# Patient Record
Sex: MALE | Race: WHITE | Employment: FULL TIME | URBAN - METROPOLITAN AREA
[De-identification: names, ages, dates, MRNs, and addresses within clinical notes are randomized per-mention and may not be internally consistent; named-entity substitution may affect disease eponyms.]

---

## 2019-12-17 ENCOUNTER — OFFICE VISIT (OUTPATIENT)
Dept: CARDIOLOGY CLINIC | Age: 59
End: 2019-12-17

## 2019-12-17 VITALS
SYSTOLIC BLOOD PRESSURE: 126 MMHG | BODY MASS INDEX: 28.19 KG/M2 | HEART RATE: 85 BPM | WEIGHT: 201.4 LBS | DIASTOLIC BLOOD PRESSURE: 82 MMHG | OXYGEN SATURATION: 94 % | HEIGHT: 71 IN

## 2019-12-17 DIAGNOSIS — I20.0 UNSTABLE ANGINA PECTORIS (HCC): ICD-10-CM

## 2019-12-17 DIAGNOSIS — E78.2 MIXED HYPERLIPIDEMIA: ICD-10-CM

## 2019-12-17 DIAGNOSIS — I10 ESSENTIAL HYPERTENSION: ICD-10-CM

## 2019-12-17 DIAGNOSIS — R00.2 PALPITATIONS: Primary | ICD-10-CM

## 2019-12-17 RX ORDER — PRAZOSIN HYDROCHLORIDE 1 MG/1
2 CAPSULE ORAL 2 TIMES DAILY
COMMUNITY

## 2019-12-17 RX ORDER — METOPROLOL SUCCINATE 25 MG/1
TABLET, EXTENDED RELEASE ORAL
Refills: 0 | Status: ON HOLD | COMMUNITY
Start: 2019-12-09 | End: 2019-12-19 | Stop reason: SDUPTHER

## 2019-12-17 RX ORDER — ATORVASTATIN CALCIUM 20 MG/1
TABLET, FILM COATED ORAL
Refills: 1 | COMMUNITY
Start: 2019-10-07

## 2019-12-17 NOTE — PROGRESS NOTES
Jean Pierre Bernabe is a 61 y.o. male    Chief Complaint   Patient presents with    New Patient     palpitations       Chest pain patient states some CP    SOB patient states some SOB with exertion    Dizziness patient states some dizziness throughout the day    Swelling some swelling in his hand    Refills No    Visit Vitals  /82 (BP 1 Location: Left arm, BP Patient Position: Sitting)   Pulse 85   Ht 5' 11\" (1.803 m)   Wt 201 lb 6.4 oz (91.4 kg)   SpO2 94%   BMI 28.09 kg/m²       1. Have you been to the ER, urgent care clinic since your last visit? Hospitalized since your last visit? No    2. Have you seen or consulted any other health care providers outside of the [address] [place] [place] [address] since your last visit? Include any pap smears or colon screening.   No

## 2019-12-18 DIAGNOSIS — R07.9 CHEST PAIN, UNSPECIFIED TYPE: Primary | ICD-10-CM

## 2019-12-18 LAB
BASOPHILS # BLD AUTO: 0.1 X10E3/UL (ref 0–0.2)
BASOPHILS NFR BLD AUTO: 1 %
BUN SERPL-MCNC: 17 MG/DL (ref 6–24)
BUN/CREAT SERPL: 16 (ref 9–20)
CALCIUM SERPL-MCNC: 9.5 MG/DL (ref 8.7–10.2)
CHLORIDE SERPL-SCNC: 104 MMOL/L (ref 96–106)
CO2 SERPL-SCNC: 21 MMOL/L (ref 20–29)
CREAT SERPL-MCNC: 1.04 MG/DL (ref 0.76–1.27)
EOSINOPHIL # BLD AUTO: 0.2 X10E3/UL (ref 0–0.4)
EOSINOPHIL NFR BLD AUTO: 4 %
ERYTHROCYTE [DISTWIDTH] IN BLOOD BY AUTOMATED COUNT: 12.2 % (ref 12.3–15.4)
GLUCOSE SERPL-MCNC: 87 MG/DL (ref 65–99)
HCT VFR BLD AUTO: 44.6 % (ref 37.5–51)
HGB BLD-MCNC: 15.1 G/DL (ref 13–17.7)
IMM GRANULOCYTES # BLD AUTO: 0 X10E3/UL (ref 0–0.1)
IMM GRANULOCYTES NFR BLD AUTO: 0 %
LYMPHOCYTES # BLD AUTO: 2 X10E3/UL (ref 0.7–3.1)
LYMPHOCYTES NFR BLD AUTO: 35 %
MCH RBC QN AUTO: 31.4 PG (ref 26.6–33)
MCHC RBC AUTO-ENTMCNC: 33.9 G/DL (ref 31.5–35.7)
MCV RBC AUTO: 93 FL (ref 79–97)
MONOCYTES # BLD AUTO: 0.5 X10E3/UL (ref 0.1–0.9)
MONOCYTES NFR BLD AUTO: 8 %
NEUTROPHILS # BLD AUTO: 3 X10E3/UL (ref 1.4–7)
NEUTROPHILS NFR BLD AUTO: 52 %
PLATELET # BLD AUTO: 221 X10E3/UL (ref 150–450)
POTASSIUM SERPL-SCNC: 4.4 MMOL/L (ref 3.5–5.2)
RBC # BLD AUTO: 4.81 X10E6/UL (ref 4.14–5.8)
SODIUM SERPL-SCNC: 144 MMOL/L (ref 134–144)
WBC # BLD AUTO: 5.8 X10E3/UL (ref 3.4–10.8)

## 2019-12-18 RX ORDER — SODIUM CHLORIDE 0.9 % (FLUSH) 0.9 %
5-40 SYRINGE (ML) INJECTION EVERY 8 HOURS
Status: CANCELLED | OUTPATIENT
Start: 2019-12-19

## 2019-12-18 RX ORDER — SODIUM CHLORIDE 0.9 % (FLUSH) 0.9 %
5-40 SYRINGE (ML) INJECTION AS NEEDED
Status: CANCELLED | OUTPATIENT
Start: 2019-12-19

## 2019-12-18 RX ORDER — SODIUM CHLORIDE 9 MG/ML
100 INJECTION, SOLUTION INTRAVENOUS CONTINUOUS
Status: CANCELLED | OUTPATIENT
Start: 2019-12-19

## 2019-12-18 NOTE — H&P (VIEW-ONLY)
[facility] [address], [address] [place], [address] [facility] Office [phone] Ann-Marie Sahu is a 61 y.o. male presents for evaluation of chest pain. Consult requested by [facility]. 
 
 
Assessment/Recommendations: ICD-10-CM ICD-9-CM 1. Palpitations R00.2 785.1 AMB POC EKG ROUTINE W/ 12 LEADS, INTER & REP  
   CBC WITH AUTOMATED DIFF  
   METABOLIC PANEL, BASIC  
2. Unstable angina pectoris (HCC) I20.0 411.1 3. Essential hypertension I10 401.9 4. Mixed hyperlipidemia E78.2 272.2 Resting chest discomfort with diaphoresis is concerning. However, he is active w/o any exertional angina symptoms Discussed role of stress testing and invasive angiography with concerning chest pan symptoms. He wishes to proceed with coronary angiography 
 
- cont metoprolol 
- cont statin therapy Primary Care Physician- Wes Goodman, DO Follow-up based on cath results Subjective: 
61 y.o. male referred from [facility] for evaluation of chest pain. He has a long history of hypertension. He reports acute onset on substernal chest discomfort associated diaphoresis approximately 10 days ago. He has had several episodes of chest discomfort at rest that last several minutes. Last episode was on Friday. He was seen at [facility] and referred to our office. No ALATORRE, SOB. Reports occasional palpitations when he lays on his side while sleeping. Unsure of his cholesterol, but has been on statin for several years. He walks several miles daily at work without any chest pain symptoms. Performed yard work last weekend w/o chest pain symptoms. Smoked 1/4 ppd for ~15 years, quit in 2014. History 
hypertension No past surgical history on file.  
 
 
Current Outpatient Medications:  
  metoprolol succinate (TOPROL-XL) 25 mg XL tablet, TAKE 1 TABLET BY MOUTH EVERY DAY, Disp: , Rfl: 0 
   atorvastatin (LIPITOR) 20 mg tablet, TAKE 1 TABLET BY MOUTH EVERY DAY, Disp: , Rfl: 1   prazosin (MINIPRESS) 1 mg capsule, Take 2 mg by mouth two (2) times a day. Indications: patient is not sure of exact dose, Disp: , Rfl:  
  allopurinol (ZYLOPRIM) 300 mg tablet, Take  by mouth daily. , Disp: , Rfl:  
  TRIAMTERENE-HYDROCHLOROTHIAZID PO, Take  by mouth., Disp: , Rfl:  
 
No Known Allergies Family History Problem Relation Age of Onset  Cancer Mother  Cancer Father Social History Tobacco Use  Smoking status: Former Smoker Packs/day: 0.25 Last attempt to quit: 2014 Years since quittin.9  Smokeless tobacco: Never Used Substance Use Topics  Alcohol use: Yes Alcohol/week: 10.0 standard drinks Types: 12 Standard drinks or equivalent per week  Drug use: Not on file Mom- AAA Works as  at [employer] Review of Symptoms: 
Pertinent Positive: chest pain, diaphoresis Pertinent Negative: no shortness of breath, white, orthopnea, pnd All Other systems reviewed and are negative for a Comprehensive ROS (10+) Physical Exam 
 
Blood pressure 126/82, pulse 85, height 5' 11\" (1.803 m), weight 201 lb 6.4 oz (91.4 kg), SpO2 94 %. Constitutional:  well-developed and well-nourished. No distress. HENT: Normocephalic. Eyes: No scleral icterus. Neck:  Neck supple. No JVD present. Pulmonary/Chest: Effort normal and breath sounds normal. No respiratory distress, wheezes or rales. Cardiovascular: Normal rate, regular rhythm, S1 S2 . Exam reveals no gallop and no friction rub. No murmur heard. No edema. Extremities:  Normal muscle tone Abdominal:   No abnormal distension. Neurological:  Moving all extremities, cranial nerves appear grossly intact. Skin: Skin is not cold. Not diaphoretic. No erythema. Psychiatric:  Grossly normal mood and affect. Intact insight. Objective Data: 
 
EC19- nsr, normal ecg Luis Silver, DO

## 2019-12-18 NOTE — PROGRESS NOTES
[facility] [address] [place] [address], [address] [place] [address], [address] [facility]    Office [phone],PXE [phone]           Thor Bai is a 61 y.o. male presents for evaluation of chest pain. Consult requested by [facility].      Assessment/Recommendations:      ICD-10-CM ICD-9-CM    1. Palpitations R00.2 785.1 AMB POC EKG ROUTINE W/ 12 LEADS, INTER & REP      CBC WITH AUTOMATED DIFF      METABOLIC PANEL, BASIC   2. Unstable angina pectoris (HCC) I20.0 411.1    3. Essential hypertension I10 401.9    4. Mixed hyperlipidemia E78.2 272.2      Resting chest discomfort with diaphoresis is concerning. However, he is active w/o any exertional angina symptoms  Discussed role of stress testing and invasive angiography with concerning chest pan symptoms. He wishes to proceed with coronary angiography    - cont metoprolol  - cont statin therapy      Primary Care Physician- Yobani Mendiola, DO    Follow-up based on cath results    Subjective:  61 y.o. male referred from [facility] for evaluation of chest pain. He has a long history of hypertension. He reports acute onset on substernal chest discomfort associated diaphoresis approximately 10 days ago. He has had several episodes of chest discomfort at rest that last several minutes. Last episode was on Friday. He was seen at [facility] and referred to our office. No ALATORRE, SOB. Reports occasional palpitations when he lays on his side while sleeping. Unsure of his cholesterol, but has been on statin for several years. He walks several miles daily at work without any chest pain symptoms. Performed yard work last weekend w/o chest pain symptoms. Smoked 1/4 ppd for ~15 years, quit in 2014. History  hypertension    No past surgical history on file.       Current Outpatient Medications:     metoprolol succinate (TOPROL-XL) 25 mg XL tablet, TAKE 1 TABLET BY MOUTH EVERY DAY, Disp: , Rfl: 0    atorvastatin (LIPITOR) 20 mg tablet, TAKE 1 TABLET BY MOUTH EVERY DAY, Disp: , Rfl: 1    prazosin (MINIPRESS) 1 mg capsule, Take 2 mg by mouth two (2) times a day. Indications: patient is not sure of exact dose, Disp: , Rfl:     allopurinol (ZYLOPRIM) 300 mg tablet, Take  by mouth daily. , Disp: , Rfl:     TRIAMTERENE-HYDROCHLOROTHIAZID PO, Take  by mouth., Disp: , Rfl:     No Known Allergies     Family History   Problem Relation Age of Onset    Cancer Mother     Cancer Father        Social History     Tobacco Use    Smoking status: Former Smoker     Packs/day: 0.25     Last attempt to quit: 2014     Years since quittin.9    Smokeless tobacco: Never Used   Substance Use Topics    Alcohol use: Yes     Alcohol/week: 10.0 standard drinks     Types: 12 Standard drinks or equivalent per week    Drug use: Not on file     Mom- AAA    Works as  at [employer]    Review of Symptoms:  Pertinent Positive: chest pain, diaphoresis  Pertinent Negative: no shortness of breath, white, orthopnea, pnd  All Other systems reviewed and are negative for a Comprehensive ROS (10+)    Physical Exam    Blood pressure 126/82, pulse 85, height 5' 11\" (1.803 m), weight 201 lb 6.4 oz (91.4 kg), SpO2 94 %. Constitutional:  well-developed and well-nourished. No distress. HENT: Normocephalic. Eyes: No scleral icterus. Neck:  Neck supple. No JVD present. Pulmonary/Chest: Effort normal and breath sounds normal. No respiratory distress, wheezes or rales. Cardiovascular: Normal rate, regular rhythm, S1 S2 . Exam reveals no gallop and no friction rub. No murmur heard. No edema. Extremities:  Normal muscle tone  Abdominal:   No abnormal distension. Neurological:  Moving all extremities, cranial nerves appear grossly intact. Skin: Skin is not cold. Not diaphoretic. No erythema. Psychiatric:  Grossly normal mood and affect. Intact insight.     Objective Data:    EC19- nsr, normal ecg                Héctor Pollack Netta Chavez

## 2019-12-19 ENCOUNTER — HOSPITAL ENCOUNTER (OUTPATIENT)
Age: 59
Setting detail: OUTPATIENT SURGERY
Discharge: HOME | End: 2019-12-19
Attending: STUDENT IN AN ORGANIZED HEALTH CARE EDUCATION/TRAINING PROGRAM | Admitting: STUDENT IN AN ORGANIZED HEALTH CARE EDUCATION/TRAINING PROGRAM
Payer: COMMERCIAL

## 2019-12-19 VITALS
WEIGHT: 199.08 LBS | DIASTOLIC BLOOD PRESSURE: 75 MMHG | HEIGHT: 71 IN | BODY MASS INDEX: 27.87 KG/M2 | RESPIRATION RATE: 18 BRPM | SYSTOLIC BLOOD PRESSURE: 122 MMHG | OXYGEN SATURATION: 97 % | HEART RATE: 62 BPM | TEMPERATURE: 98.5 F

## 2019-12-19 DIAGNOSIS — R06.9 RESPIRATORY ABNORMALITIES: ICD-10-CM

## 2019-12-19 DIAGNOSIS — R07.9 CHEST PAIN, UNSPECIFIED TYPE: ICD-10-CM

## 2019-12-19 PROCEDURE — 77030029065 HC DRSG HEMO QCLOT ZMED -B

## 2019-12-19 PROCEDURE — 74011636320 HC RX REV CODE- 636/320: Performed by: STUDENT IN AN ORGANIZED HEALTH CARE EDUCATION/TRAINING PROGRAM

## 2019-12-19 PROCEDURE — C1769 GUIDE WIRE: HCPCS | Performed by: STUDENT IN AN ORGANIZED HEALTH CARE EDUCATION/TRAINING PROGRAM

## 2019-12-19 PROCEDURE — 75710 ARTERY X-RAYS ARM/LEG: CPT | Performed by: STUDENT IN AN ORGANIZED HEALTH CARE EDUCATION/TRAINING PROGRAM

## 2019-12-19 PROCEDURE — 77030019569 HC BND COMPR RAD TERU -B: Performed by: STUDENT IN AN ORGANIZED HEALTH CARE EDUCATION/TRAINING PROGRAM

## 2019-12-19 PROCEDURE — C1894 INTRO/SHEATH, NON-LASER: HCPCS | Performed by: STUDENT IN AN ORGANIZED HEALTH CARE EDUCATION/TRAINING PROGRAM

## 2019-12-19 PROCEDURE — 74011250636 HC RX REV CODE- 250/636: Performed by: STUDENT IN AN ORGANIZED HEALTH CARE EDUCATION/TRAINING PROGRAM

## 2019-12-19 PROCEDURE — 77030008543 HC TBNG MON PRSS MRTM -A: Performed by: STUDENT IN AN ORGANIZED HEALTH CARE EDUCATION/TRAINING PROGRAM

## 2019-12-19 PROCEDURE — 99152 MOD SED SAME PHYS/QHP 5/>YRS: CPT | Performed by: STUDENT IN AN ORGANIZED HEALTH CARE EDUCATION/TRAINING PROGRAM

## 2019-12-19 PROCEDURE — 74011250637 HC RX REV CODE- 250/637: Performed by: STUDENT IN AN ORGANIZED HEALTH CARE EDUCATION/TRAINING PROGRAM

## 2019-12-19 PROCEDURE — C1887 CATHETER, GUIDING: HCPCS | Performed by: STUDENT IN AN ORGANIZED HEALTH CARE EDUCATION/TRAINING PROGRAM

## 2019-12-19 PROCEDURE — 74011000250 HC RX REV CODE- 250: Performed by: STUDENT IN AN ORGANIZED HEALTH CARE EDUCATION/TRAINING PROGRAM

## 2019-12-19 PROCEDURE — 93454 CORONARY ARTERY ANGIO S&I: CPT | Performed by: STUDENT IN AN ORGANIZED HEALTH CARE EDUCATION/TRAINING PROGRAM

## 2019-12-19 RX ORDER — SODIUM CHLORIDE 0.9 % (FLUSH) 0.9 %
5-40 SYRINGE (ML) INJECTION EVERY 8 HOURS
Status: DISCONTINUED | OUTPATIENT
Start: 2019-12-19 | End: 2019-12-19 | Stop reason: HOSPADM

## 2019-12-19 RX ORDER — METOPROLOL SUCCINATE 50 MG/1
50 TABLET, EXTENDED RELEASE ORAL DAILY
Qty: 30 TAB | Refills: 3 | Status: SHIPPED | OUTPATIENT
Start: 2019-12-19 | End: 2020-04-09

## 2019-12-19 RX ORDER — LIDOCAINE HYDROCHLORIDE 10 MG/ML
INJECTION INFILTRATION; PERINEURAL AS NEEDED
Status: DISCONTINUED | OUTPATIENT
Start: 2019-12-19 | End: 2019-12-19 | Stop reason: HOSPADM

## 2019-12-19 RX ORDER — HEPARIN SODIUM 1000 [USP'U]/ML
INJECTION, SOLUTION INTRAVENOUS; SUBCUTANEOUS AS NEEDED
Status: DISCONTINUED | OUTPATIENT
Start: 2019-12-19 | End: 2019-12-19 | Stop reason: HOSPADM

## 2019-12-19 RX ORDER — SODIUM CHLORIDE 0.9 % (FLUSH) 0.9 %
5-40 SYRINGE (ML) INJECTION AS NEEDED
Status: DISCONTINUED | OUTPATIENT
Start: 2019-12-19 | End: 2019-12-19 | Stop reason: HOSPADM

## 2019-12-19 RX ORDER — MIDAZOLAM HYDROCHLORIDE 1 MG/ML
INJECTION, SOLUTION INTRAMUSCULAR; INTRAVENOUS AS NEEDED
Status: DISCONTINUED | OUTPATIENT
Start: 2019-12-19 | End: 2019-12-19 | Stop reason: HOSPADM

## 2019-12-19 RX ORDER — FENTANYL CITRATE 50 UG/ML
INJECTION, SOLUTION INTRAMUSCULAR; INTRAVENOUS AS NEEDED
Status: DISCONTINUED | OUTPATIENT
Start: 2019-12-19 | End: 2019-12-19 | Stop reason: HOSPADM

## 2019-12-19 RX ORDER — VERAPAMIL HYDROCHLORIDE 2.5 MG/ML
INJECTION, SOLUTION INTRAVENOUS AS NEEDED
Status: DISCONTINUED | OUTPATIENT
Start: 2019-12-19 | End: 2019-12-19 | Stop reason: HOSPADM

## 2019-12-19 RX ORDER — SODIUM CHLORIDE 9 MG/ML
100 INJECTION, SOLUTION INTRAVENOUS CONTINUOUS
Status: DISCONTINUED | OUTPATIENT
Start: 2019-12-19 | End: 2019-12-19 | Stop reason: HOSPADM

## 2019-12-19 RX ORDER — HEPARIN SODIUM 200 [USP'U]/100ML
INJECTION, SOLUTION INTRAVENOUS
Status: COMPLETED | OUTPATIENT
Start: 2019-12-19 | End: 2019-12-19

## 2019-12-19 RX ORDER — ASPIRIN 325 MG
325 TABLET ORAL ONCE
Status: COMPLETED | OUTPATIENT
Start: 2019-12-19 | End: 2019-12-19

## 2019-12-19 RX ADMIN — ASPIRIN 325 MG: 325 TABLET, FILM COATED ORAL at 11:09

## 2019-12-19 RX ADMIN — SODIUM CHLORIDE 100 ML/HR: 900 INJECTION, SOLUTION INTRAVENOUS at 10:00

## 2019-12-19 NOTE — PROGRESS NOTES
Patient arrived. ID and allergies verified verbally with patient. Pt voices understanding of procedure to be performed. Consent obtained. Pt prepped for procedure. 11:10, here.  mg given. 14:30 TRANSFER - OUT REPORT:    Verbal report given to ROCHELLE Angel(name) on Fredy Opitz  being transferred to cath(unit) for routine progression of care       Report consisted of patients Situation, Background, Assessment and   Recommendations(SBAR). Information from the following report(s) Procedure Summary was reviewed with the receiving nurse. Lines:   Peripheral IV 12/19/19 Left Antecubital (Active)   Site Assessment Clean, dry, & intact 12/19/2019 10:34 AM        Opportunity for questions and clarification was provided. Patient transported with:   Registered Nurse   15:10 TRANSFER - IN REPORT:    Verbal report received from Kiya(name) on Fredy Opitz  being received from cath(unit) for routine progression of care      Report consisted of patients Situation, Background, Assessment and   Recommendations(SBAR). Information from the following report(s) Procedure Summary was reviewed with the receiving nurse. Opportunity for questions and clarification was provided. Assessment completed upon patients arrival to unit and care assumed. 16:30 Air release completed. TR Band removed from rt wrist. No bleeding or  Hematoma. Dressing applied. Wrist immobilizer in place. Radial and ulnar pulse remain palpable on affected extremity. Pt tolerated well. Instructions given to pt regarding movement and activity restrictions. Pt voiced understanding.

## 2019-12-19 NOTE — DISCHARGE INSTRUCTIONS
Transradial Cardiac Catheterization/Angiography Discharge Instructions    It is normal to feel tired the first couple days. Take it easy and follow the physicians instructions. Wear wrist splint for first 24 hours to keep the wrist stable. No repetitive flexing of wrist.       CHECK THE CATHETER INSERTION SITE DAILY:  Remove the wrist dressing 24 hours after the procedure. You may shower 24 hours after the procedure. Wash with soap and water and pat dry. Gentle cleaning of the site with soap and water is sufficient, cover with a dry clean dressing or bandage. Do not apply creams or powders to the area. No soaking the wrist for 3 days. Leave the puncture site open to air after 24 hours post-procedure. CALL THE PHYSICIANS:   If you have signs of infection, such as:            - A fever  If the site becomes red, swollen or feels warm to the touch  If there is drainage or if there is unusual pain at the radial site. MONITOR FOR BLEEDING:    If there is any minor oozing, you may apply a band-aid and remove after 12 hours. If the bleeding continues or if there is a lot of bleeding hold pressure with the middle finger against the puncture site and the thumb against the back of the wrist,call 911 to be transported to the hospital.  DO NOT DRIVE YOURSELF, [employer] [id number]. ACTIVITY:   For the first 24 hours do not manipulate the wrist.  No lifting, pushing or pulling over 3-5 pounds with the affected wrist for 7 daysand no straining the insertion site. Do not life grocery bags or the garbage can, do not run the vacuum  or  for 7 days. Start with short walks as in the hospital and gradually increase as tolerated each day. It is recommended to walk 30 minutes 5-7 days per week. Follow your physicians instructions on activity. Avoid walking outside in extremes of heat or cold. Walk inside when it is cold and windy or hot and humid.      Things to keep in mind:  No driving for at least 24 hours, or as designated by your physician. Limit the number of times you go up and down the stairs  Take rests and pace yourself with activity. Be careful and do not strain with bowel movements. Diet:  Drink plenty of fluids for the next 24 - 48 hours to help your body flush out the dye. If you have kidney, heart, or liver disease and have to limit fluids, talk with your doctor before you increase the amount of fluids you drink. Keep eating a heart-healthy, low-fat diet that has lots of fruits, vegetables, and whole grains.

## 2019-12-19 NOTE — PROGRESS NOTES
4882    Discharge instructions reviewed with patient and family. Voiced understanding. Patient given copy of discharge instructions to take home. [phone]    Pt discharged via wheeled chair  with  family. Personal belongings with patient upon discharge.

## 2019-12-19 NOTE — INTERVAL H&P NOTE
H&P Update:  Juaquin Kussmaul was seen and examined. History and physical has been reviewed. The patient has been examined.  There have been no significant clinical changes since the completion of the originally dated History and Physical.

## 2019-12-19 NOTE — PROCEDURES
BRIEF PROCEDURE NOTE    Date of Procedure: 12/19/2019   Preoperative Diagnosis: chest pain  Postoperative Diagnosis: chest pain    Procedure: coronary angiography  Interventional Cardiologist: Nancy Krishnamurthy DO  Assistant: None  Anesthesia: local + IV moderate sedation   Estimated Blood Loss: Minimal    Access: right radial artery, 6F  Catheters:  Left coronary: JL 3.5 5F  Right coronary: JR4 5F    Findings:   L Main: large caliber, normal  LAD: large caliber, wraps around apex, small mid-vessel myocardial bridge, no significant disease,   LCx: large caliber, dominant, no significant disease, OM1 large no significant disease, L-PL branch moderate no significant disease, L-PDA small caliber no significant disease  RCA: small, non-dominant    Specimens Removed: None    Implants: None    Closure Device: radial TR band    See full cath note. Complications: none    Findings:  1.  No significant CAD      DO Deangelo Bowen DO  [facility] [address] [facility] Comment [address] [facility], [address] [place] [address]                                       Office [phone],[facility] [phone]

## 2019-12-19 NOTE — Clinical Note
TRANSFER - OUT REPORT:     Verbal report given to: lion. Report consisted of patient's Situation, Background, Assessment and   Recommendations(SBAR). Opportunity for questions and clarification was provided. Patient transported with a Registered Nurse. [facility] [place]: [facility]

## 2019-12-19 NOTE — Clinical Note
TRANSFER - IN REPORT:     Verbal report received from: lion. Report consisted of patient's Situation, Background, Assessment and   Recommendations(SBAR). Opportunity for questions and clarification was provided. Assessment completed upon patient's arrival to unit and care assumed. Patient transported with a Registered Nurse.

## 2020-01-08 ENCOUNTER — OFFICE VISIT (OUTPATIENT)
Dept: CARDIOLOGY CLINIC | Age: 60
End: 2020-01-08

## 2020-01-08 VITALS
HEART RATE: 71 BPM | WEIGHT: 200.6 LBS | OXYGEN SATURATION: 97 % | SYSTOLIC BLOOD PRESSURE: 136 MMHG | BODY MASS INDEX: 28.08 KG/M2 | HEIGHT: 71 IN | DIASTOLIC BLOOD PRESSURE: 76 MMHG

## 2020-01-08 DIAGNOSIS — R07.89 OTHER CHEST PAIN: Primary | ICD-10-CM

## 2020-01-08 DIAGNOSIS — R00.2 PALPITATION: ICD-10-CM

## 2020-01-08 NOTE — PROGRESS NOTES
Chief Complaint   Patient presents with    Follow-up     Patient presents today for a follow up visit for HTN    Hypertension    Cholesterol Problem         Visit Vitals  /76 (BP 1 Location: Left arm, BP Patient Position: Sitting)   Pulse 71   Ht 5' 11\" (1.803 m)   Wt 200 lb 9.6 oz (91 kg)   SpO2 97%   BMI 27.98 kg/m²         Chest pain denied  SOB - denied  Dizziness denied  Swelling/Edema - denied  Recent hospital visit denied  Refills denied

## 2020-01-08 NOTE — PROGRESS NOTES
[facility] [address], [address] [facility], [address] [place] [address]    Office [phone],[facility] [phone]           Mel Albert is a 61 y.o. male presents for evaluation of chest pain and palpitations      Assessment/Recommendations:    Chest pain symptoms- no obstructive CAD on cath , improved symptoms with metoprolol  Palpitations- improved with BB therapy    - cont metoprolol 50mg daily, consider titration if he develops increase in palpitation symptoms. He will notify me via [employer]. - cont statin therapy      Primary Care Physician- Princess Cordova MD    Follow-up 6 months    Subjective:  61 y.o. male presents for f/up evaluation. Initially present  with chest pain and diaphoresis. Cardiac cath w/o significant obstructive CAD. Chest pain and palpitations improved with metoprolol. He currently has about 1-2 episodes of palpitations daily, describes as a few skipped beats. No sustained heart racing. No ALATORRE, orthopnea, pnd.    History  hypertension    No past surgical history on file. Current Outpatient Medications:     metoprolol succinate (TOPROL-XL) 50 mg XL tablet, Take 1 Tab by mouth daily. , Disp: 30 Tab, Rfl: 3    atorvastatin (LIPITOR) 20 mg tablet, TAKE 1 TABLET BY MOUTH EVERY DAY, Disp: , Rfl: 1    prazosin (MINIPRESS) 1 mg capsule, Take 2 mg by mouth two (2) times a day. Indications: patient is not sure of exact dose, Disp: , Rfl:     allopurinol (ZYLOPRIM) 300 mg tablet, Take  by mouth daily. , Disp: , Rfl:     No Known Allergies     Family History   Problem Relation Age of Onset    Cancer Mother     Cancer Father        Social History     Tobacco Use    Smoking status: Former Smoker     Packs/day: 0.25     Last attempt to quit: 2014     Years since quittin.0    Smokeless tobacco: Never Used   Substance Use Topics    Alcohol use:  Yes     Alcohol/week: 10.0 standard drinks     Types: 12 Standard drinks or equivalent per week    Drug use: Not on file     Mom- AAA    Works as  at [employer]    Review of Symptoms:  Pertinent Positive: chest pain, diaphoresis  Pertinent Negative: no shortness of breath, white, orthopnea, pnd  All Other systems reviewed and are negative for a Comprehensive ROS (10+)    Physical Exam    Blood pressure 136/76, pulse 71, height 5' 11\" (1.803 m), weight 200 lb 9.6 oz (91 kg), SpO2 97 %. Constitutional:  well-developed and well-nourished. No distress. HENT: Normocephalic. Eyes: No scleral icterus. Neck:  Neck supple. No JVD present. Pulmonary/Chest: Effort normal and breath sounds normal. No respiratory distress, wheezes or rales. Cardiovascular: Normal rate, regular rhythm, S1 S2 . Exam reveals no gallop and no friction rub. No murmur heard. No edema. Extremities:  Normal muscle tone  Abdominal:   No abnormal distension. Neurological:  Moving all extremities, cranial nerves appear grossly intact. Skin: Skin is not cold. Not diaphoretic. No erythema. Psychiatric:  Grossly normal mood and affect. Intact insight.     Objective Data:    EC19- nsr, normal ecg          19   CARDIAC PROCEDURE 2019    Narrative · No significant CAD     L Main: large caliber, normal  LAD: large caliber, wraps around apex, small mid-vessel myocardial bridge,   no significant disease,   LCx: large caliber, dominant, no significant disease, OM1 large no   significant disease, L-PL branch moderate no significant disease, L-PDA   small caliber no significant disease  RCA: small, non-dominant       Signed by: DO Haroldo Issa DO

## 2020-04-09 RX ORDER — METOPROLOL SUCCINATE 50 MG/1
TABLET, EXTENDED RELEASE ORAL
Qty: 30 TAB | Refills: 3 | Status: SHIPPED | OUTPATIENT
Start: 2020-04-09 | End: 2020-11-17

## 2020-07-15 ENCOUNTER — OFFICE VISIT (OUTPATIENT)
Dept: CARDIOLOGY CLINIC | Age: 60
End: 2020-07-15

## 2020-07-15 VITALS
SYSTOLIC BLOOD PRESSURE: 128 MMHG | HEIGHT: 71 IN | BODY MASS INDEX: 26.46 KG/M2 | WEIGHT: 189 LBS | OXYGEN SATURATION: 96 % | DIASTOLIC BLOOD PRESSURE: 82 MMHG | HEART RATE: 78 BPM

## 2020-07-15 DIAGNOSIS — I10 ESSENTIAL HYPERTENSION: ICD-10-CM

## 2020-07-15 DIAGNOSIS — R07.89 OTHER CHEST PAIN: Primary | ICD-10-CM

## 2020-07-15 DIAGNOSIS — R00.2 PALPITATION: ICD-10-CM

## 2020-07-15 DIAGNOSIS — E78.2 MIXED HYPERLIPIDEMIA: ICD-10-CM

## 2020-07-15 NOTE — PROGRESS NOTES
[facility] [address], [address] [address]    Office [phone],[place] [phone]           Ann-Marie Sahu is a 61 y.o. male presents for f/up  evaluation of chest pain and palpitations      Assessment/Recommendations:    Chest pain symptoms- no obstructive CAD on cath , improved symptoms with metoprolol  Palpitations- improved with BB therapy    - cont metoprolol 50mg daily  - cont statin therapy      Primary Care Physician- Cory Ellis MD    Follow-up 1 year    Subjective:  61 y.o. male presents for f/up evaluation. Initially present  with palpitations, chest pain and diaphoresis. Cardiac cath w/o significant obstructive CAD. Chest pain and palpitations improved with metoprolol titration. Improved palpitations. Active rides stationary bicycle several days per week for 20 miles. History  hypertension    History reviewed. No pertinent surgical history. Current Outpatient Medications:     metoprolol succinate (TOPROL-XL) 50 mg XL tablet, TAKE 1 TABLET BY MOUTH EVERY DAY, Disp: 30 Tab, Rfl: 3    atorvastatin (LIPITOR) 20 mg tablet, TAKE 1 TABLET BY MOUTH EVERY DAY, Disp: , Rfl: 1    prazosin (MINIPRESS) 1 mg capsule, Take 2 mg by mouth two (2) times a day. Indications: patient is not sure of exact dose, Disp: , Rfl:     allopurinol (ZYLOPRIM) 300 mg tablet, Take  by mouth daily. , Disp: , Rfl:     No Known Allergies     Family History   Problem Relation Age of Onset    Cancer Mother     Cancer Father        Social History     Tobacco Use    Smoking status: Former Smoker     Packs/day: 0.25     Last attempt to quit: 2014     Years since quittin.5    Smokeless tobacco: Never Used   Substance Use Topics    Alcohol use:  Yes     Alcohol/week: 10.0 standard drinks     Types: 12 Standard drinks or equivalent per week    Drug use: Not on file     Mom- AAA    Works as  at [employer]    Review of Symptoms:  Pertinent Positive: chest pain, diaphoresis  Pertinent Negative: no shortness of breath, white, orthopnea, pnd  All Other systems reviewed and are negative for a Comprehensive ROS (10+)    Physical Exam    Blood pressure 128/82, pulse 78, height 5' 11\" (1.803 m), weight 189 lb (85.7 kg), SpO2 96 %. Constitutional:  well-developed and well-nourished. No distress. HENT: Normocephalic. Eyes: No scleral icterus. Neck:  Neck supple. No JVD present. Pulmonary/Chest: Effort normal and breath sounds normal. No respiratory distress, wheezes or rales. Cardiovascular: Normal rate, regular rhythm, S1 S2 . Exam reveals no gallop and no friction rub. No murmur heard. No edema. Extremities:  Normal muscle tone  Abdominal:   No abnormal distension. Neurological:  Moving all extremities, cranial nerves appear grossly intact. Skin: Skin is not cold. Not diaphoretic. No erythema. Psychiatric:  Grossly normal mood and affect. Intact insight.     Objective Data:    EC19- nsr, normal ecg          19   CARDIAC PROCEDURE 2019    Narrative · No significant CAD     L Main: large caliber, normal  LAD: large caliber, wraps around apex, small mid-vessel myocardial bridge,   no significant disease,   LCx: large caliber, dominant, no significant disease, OM1 large no   significant disease, L-PL branch moderate no significant disease, L-PDA   small caliber no significant disease  RCA: small, non-dominant       Signed by: DO Ronel Lara DO

## 2020-07-15 NOTE — LETTER
7/15/20 Patient: Jean Pierre Bernabe YOB: 1960 Date of Visit: 7/15/2020 Tomi Dangelo MD 
[address] [address] VIA Facsimile: [phone] Dear Tomi Dangelo MD, Thank you for referring Mr. Jean Pierre Bernabe to [facility] [address] [place] [address] for evaluation. My notes for this consultation are attached. If you have questions, please do not hesitate to call me. I look forward to following your patient along with you.  
 
 
Sincerely, 
 
Marcelo Carrasco, DO

## 2020-07-15 NOTE — PROGRESS NOTES
Aicha Arndt is a 61 y.o. male    Chief Complaint   Patient presents with    Chest Pain    Palpitations       Chest pain No    SOB No    Dizziness patient states some dizziness at times    Swelling No    Refills No    Visit Vitals  /82 (BP 1 Location: Left arm, BP Patient Position: Sitting)   Pulse 78   Ht 5' 11\" (1.803 m)   Wt 189 lb (85.7 kg)   SpO2 96%   BMI 26.36 kg/m²       1. Have you been to the ER, urgent care clinic since your last visit? Hospitalized since your last visit? No    2. Have you seen or consulted any other health care providers outside of the [address] [place] [address] since your last visit? Include any pap smears or colon screening.   No

## 2020-11-17 RX ORDER — METOPROLOL SUCCINATE 50 MG/1
TABLET, EXTENDED RELEASE ORAL
Qty: 90 TAB | Refills: 2 | Status: SHIPPED | OUTPATIENT
Start: 2020-11-17

## 2024-03-04 NOTE — Clinical Note
Transaminitis with trend upward, current  and .  Patient denies abdominal pain and has no tenderness to palpation on exam.  Suspect related to hepatic congestion with CHF exacerbation.  Check a.m. labs.     Single view of the right radial artery obtained using hand injection.

## 2024-04-19 NOTE — Clinical Note
Diagnostic wire inserted.   Wire type: In-Q. private house +6-7 steps right side rail- hiring a private nurse when going home/spouse

## (undated) DEVICE — ADAPTER CLR ROT AIRLESS W/ SHERLOCK CONN M LUER

## (undated) DEVICE — GUIDE CATH CONVEY 5FR JR4 -- 39228-686

## (undated) DEVICE — TUBING PRSS MON L12IN PVC RIG NONEXPANDING M TO FEM CONN

## (undated) DEVICE — HI-TORQUE VERSACORE MODIFIED J GUIDE WIRE SYSTEM 145 CM: Brand: HI-TORQUE VERSACORE

## (undated) DEVICE — PACK PROCEDURE SURG HRT CATH

## (undated) DEVICE — GUIDE CATH CONVEY 5FR JL3.5 -- 39228-661

## (undated) DEVICE — SUPPORT WRST AD W3.5XL9IN DIA14.5IN ART SFT ADJ HK AND LOOP

## (undated) DEVICE — GLIDESHEATH SLENDER STAINLESS STEEL KIT: Brand: GLIDESHEATH SLENDER

## (undated) DEVICE — SYRINGE ANGIO 10ML RED FLAT GRP FIX M LUER CONN MEDALLION

## (undated) DEVICE — PROCEDURE KIT FLUID MGMT CUST MAINFOLD STRL

## (undated) DEVICE — Z DUPLICATE USE 2526246 STOPCOCK IV R 3 W HI PRSS

## (undated) DEVICE — ARGO BAGZ FLUID MANAGEMENT SYSTEM: Brand: ARGO BAGZ FLUID MANAGEMENT SYSTEM

## (undated) DEVICE — TR BAND RADIAL ARTERY COMPRESSION DEVICE: Brand: TR BAND